# Patient Record
(demographics unavailable — no encounter records)

---

## 2025-01-31 NOTE — DISCUSSION/SUMMARY
[FreeTextEntry1] : Mild COPD reviewed and discussed. Candidate for continued CT lung cancer screening.

## 2025-01-31 NOTE — HISTORY OF PRESENT ILLNESS
[Former] : former [TextBox_4] : This visit was provided via telehealth using real-time 2-way audio visual technology. The patient, MARIFER BULLARD , was located at home, [address] [place] [address]  at the time of the visit. The provider, Robert Ferguson, was located at office [address] [place] [address] at the time of the visit.    The patient, Ms. MARIFER BULLARD  and Physician Robert Rai participated in the telehealth encounter.  Verbal consent obtained by  from patient  Patient last seen in the office in Nov 2022 was due for ct chest screening last one was 5/2023 received letter  did have  ct calcium score 0   Feels respiratory status is stable. Significant cough wheezing or mucus production.  Describes reasonable exercise tolerance. No chest congestion.      [TextBox_11] : 1-2 [TextBox_13] : 30 [YearQuit] : 2021

## 2025-01-31 NOTE — PROCEDURE
[FreeTextEntry1] : CAT scan on 4/21 2022 reviewed.  11/16/2022 Pulmonary function testing Normal Flow Rates Normal Lung Volumes. Airway resistance is normal. There is a mild diffusion impairment.  Compared to May 2021 there is a mild decrease in flow rates without change in diffusion capacity.  There is a mild decrease in TLC.

## 2025-01-31 NOTE — ASSESSMENT
[FreeTextEntry1] : Urged not to restart smoking. Screening CT of the chest. Recommend patient follow-up in office and obtain repeat lung function testing.

## 2025-04-21 NOTE — HISTORY OF PRESENT ILLNESS
[FreeTextEntry1] : This is a 54 y/o female with a pmhx of HLD presents today for annual wellness exam. Patient feels well. Patient denies chest pain, dyspnea, palpitations, dizziness, syncope, changes in bowel/bladder habits or appetite.

## 2025-04-21 NOTE — PHYSICAL EXAM
[Well Developed] : well developed [Well Nourished] : well nourished [No Acute Distress] : no acute distress [Normal Conjunctiva] : normal conjunctiva [Normal Venous Pressure] : normal venous pressure [No Carotid Bruit] : no carotid bruit [Normal S1, S2] : normal S1, S2 [No Rub] : no rub [No Gallop] : no gallop [Clear Lung Fields] : clear lung fields [Good Air Entry] : good air entry [No Respiratory Distress] : no respiratory distress  [Soft] : abdomen soft [Non Tender] : non-tender [No Masses/organomegaly] : no masses/organomegaly [Normal Bowel Sounds] : normal bowel sounds [Normal Gait] : normal gait [No Edema] : no edema [No Cyanosis] : no cyanosis [No Clubbing] : no clubbing [No Varicosities] : no varicosities [No Rash] : no rash [No Skin Lesions] : no skin lesions [Moves all extremities] : moves all extremities [No Focal Deficits] : no focal deficits [Normal Speech] : normal speech [Alert and Oriented] : alert and oriented [Normal memory] : normal memory [de-identified] : 2/6 systolic murmur apex

## 2025-07-23 NOTE — ADDENDUM
[FreeTextEntry1] :  I, Alina Ferraro, acted solely as a scribe for Dr. Timoteo Ordonez MD on this date 07/23/2025    All medical record entries made by the Scribe were at my, Dr. Timoteo Ordonez MD., direction and personally dictated by me on 07/23/2025 . I have reviewed the chart and agree that the record accurately reflects my personal performance of the history, physical exam, assessment and plan. I have also personally directed, reviewed, and agreed with the chart.

## 2025-07-23 NOTE — ASSESSMENT
[FreeTextEntry1] :  I had a lengthy discussion with the patient in regards to treatment plan and diagnosis. There are no red flag findings on imaging nor are there any red flag findings on clinical exams.  Therefore we will proceed with a course of conservative treatment. This would include physical therapy/home exercise program, Tylenol, NSAIDs as medically indicated.  The patient will follow up with me as needed.  I encouraged the patient to follow-up sooner if there are any new or worsening symptoms.

## 2025-07-23 NOTE — PHYSICAL EXAM
[de-identified] :  Cervical Physical Exam   Gait - Normal   Station - Normal   Sagittal balance - Normal   Compensatory mechanism? - None   Horizontal gaze - Maintained    Reflexes Biceps - Normal Triceps - Normal Brachioradialis - Normal Patellar - Normal Gastroc - Normal Clonus -No   Hoffmans - None   Shoulder Exam - Normal   Spurlings - None   Wrist Pulses -2+ radial/ulnar   Foot Pulses -2+ DP/PT   Cervical range of motion - Normal   Sensation C5-T1 sensation intact to light touch bilaterally   L1-S1 sensation intact to light touch bilaterally   Motor   Deltoid Biceps Triceps WF WE IO  Right 5/5 5/5 5/5 5/5 5/5 5/5 5/5 Left 5/5 5/5 5/5 5/5 5/5 5/5 5/5   IP Quad HS TA Gastroc EHL Right 5/5 5/5 5/5 5/5 5/5 5/5 Left 5/5 5/5 5/5 5/5 5/5 5/5 [de-identified] : lumbar MRI ([facility]) no critical areas of central or foraminal stenosis  thoracic MRI ([facility] Pes[facility]) no critical areas of central or foraminal stenosis  cervical MRI ([facility] Pesiri) no critical areas of central or foraminal stenosis  previous imaging: lumbar rads 2 views moderate facet arthropathy moderate disc degeneration

## 2025-07-23 NOTE — REASON FOR VISIT
[Home] : at home, [unfilled] , at the time of the visit. [Medical Office: ([facility])___] : at the medical office located in  [Telehealth (audio & video)] : This visit was provided via telehealth using real-time 2-way audio visual technology. [Verbal consent obtained from patient] : the patient, [unfilled] [Follow-Up Visit] : a follow-up visit for [Back Pain] : back pain

## 2025-07-23 NOTE — HISTORY OF PRESENT ILLNESS
[de-identified] : Patient is a 56 year old female who presents today for an evaluation of low back pain. She states that she is doing well. Patient has refrained from pickleball. She has utilized chiropractic care. She is here to review her cervical, thoracic, and lumbar MRI.   06.30.25 Patient is a 56 year old female who presents today for an initial evaluation of low back pain that began after playing pickleball two weeks ago. Her pain dissipated and then flared up days after the incident at work. She utilized ice/heat which provided relief. Denies radicular sxs. She followed up with a chiropractor.